# Patient Record
Sex: FEMALE | Race: OTHER | HISPANIC OR LATINO | URBAN - METROPOLITAN AREA
[De-identification: names, ages, dates, MRNs, and addresses within clinical notes are randomized per-mention and may not be internally consistent; named-entity substitution may affect disease eponyms.]

---

## 2021-10-27 ENCOUNTER — OFFICE VISIT (OUTPATIENT)
Dept: URBAN - METROPOLITAN AREA CLINIC 98 | Facility: CLINIC | Age: 61
End: 2021-10-27

## 2021-12-01 ENCOUNTER — OFFICE VISIT (OUTPATIENT)
Dept: URBAN - METROPOLITAN AREA CLINIC 98 | Facility: CLINIC | Age: 61
End: 2021-12-01
Payer: COMMERCIAL

## 2021-12-01 DIAGNOSIS — Z86.010 PERSONAL HISTORY OF COLONIC POLYPS: ICD-10-CM

## 2021-12-01 DIAGNOSIS — K57.30 ACQUIRED DIVERTICULOSIS OF COLON: ICD-10-CM

## 2021-12-01 DIAGNOSIS — M05.79 RHEUMATOID ARTHRITIS INVOLVING MULTIPLE SITES WITH POSITIVE RHEUMATOID FACTOR: ICD-10-CM

## 2021-12-01 DIAGNOSIS — R76.8 HEPATITIS C ANTIBODY POSITIVE IN BLOOD: ICD-10-CM

## 2021-12-01 PROCEDURE — 99243 OFF/OP CNSLTJ NEW/EST LOW 30: CPT | Performed by: INTERNAL MEDICINE

## 2021-12-01 RX ORDER — PRAVASTATIN SODIUM 40 MG/1
TABLET ORAL
Qty: 0 | Refills: 0 | Status: ACTIVE | COMMUNITY
Start: 1900-01-01

## 2021-12-01 RX ORDER — OLMESARTAN MEDOXOMIL 20 MG/1
1 TABLET TABLET, FILM COATED ORAL ONCE A DAY
Status: ACTIVE | COMMUNITY

## 2021-12-01 RX ORDER — FAMOTIDINE 40 MG/1
1 TABLET AT BEDTIME TABLET, FILM COATED ORAL ONCE A DAY
Status: ACTIVE | COMMUNITY

## 2021-12-01 RX ORDER — PREDNISONE 2.5 MG/1
1 TABLET TABLET ORAL ONCE A DAY
Status: ACTIVE | COMMUNITY

## 2021-12-01 RX ORDER — BUPRENORPHINE 10 UG/H
1 PATCH TO SKIN PATCH, EXTENDED RELEASE TRANSDERMAL
Status: ACTIVE | COMMUNITY

## 2021-12-01 RX ORDER — ESCITALOPRAM 10 MG/1
1 TABLET TABLET, FILM COATED ORAL ONCE A DAY
Status: ACTIVE | COMMUNITY

## 2021-12-01 NOTE — HPI-TODAY'S VISIT:
Patient referred by Héctor Menendez MD for evaluation of positive HCV serology. Copy of this consult OV sent to Dr. Menendez. 60 yo pt w Hx seropositive RA, who will be started on MTX / Folic acid q weeek. Pre MTX labs showed a + HCV ab and she was referred fir further evaluation. Additional labs: normal CBC, ESR 3,  normal CMP including transaminases, CRP 3, HAV / HBV serologies were negative. She has a Hx chronic HCV 25 yrs ago, RX'd w PEG-INF /  RBV x 1 yr w SVR. In 2015, HCV-RNA  undetected, normal AFP  and RUQ-US. No HCC surveillance since.  She has no abdominal pain, change in bowel pattern and has chronic GERD controlled w diet and Famotidine 40 mg/d. No alarm nor extraesophageal ENRIQUE sxs.  Chronic LBP due LS-disc heniation on Buprenorphine and Diclofenac. She has no constitutional sxs and denies anorexia or weight loss.  EGD 10/13: NERD, Hp-negative gastritis. Colonoscopy 6/19: L-diverticulosis w spasm and TA in [place]. No COVID-19 exposure and has received 2 doses of COVID-19 vaccine.

## 2021-12-04 ENCOUNTER — DASHBOARD ENCOUNTERS (OUTPATIENT)
Age: 61
End: 2021-12-04

## 2021-12-04 PROBLEM — 287006005: Status: ACTIVE | Noted: 2021-12-04

## 2021-12-04 PROBLEM — 397881000: Status: ACTIVE | Noted: 2021-12-04

## 2022-05-05 ENCOUNTER — OFFICE VISIT (OUTPATIENT)
Dept: URBAN - METROPOLITAN AREA SURGERY CENTER 18 | Facility: SURGERY CENTER | Age: 62
End: 2022-05-05

## 2022-06-03 ENCOUNTER — LAB OUTSIDE AN ENCOUNTER (OUTPATIENT)
Dept: URBAN - METROPOLITAN AREA CLINIC 98 | Facility: CLINIC | Age: 62
End: 2022-06-03

## 2022-06-06 LAB
HCV GENOTYPE: (no result)
HCV LOG10: (no result)
HEPATITIS C QUANTITATION: (no result)
TEST INFORMATION:: (no result)

## 2022-06-13 PROBLEM — 428283002: Status: ACTIVE | Noted: 2021-12-01

## 2022-06-30 ENCOUNTER — OFFICE VISIT (OUTPATIENT)
Dept: URBAN - METROPOLITAN AREA SURGERY CENTER 18 | Facility: SURGERY CENTER | Age: 62
End: 2022-06-30